# Patient Record
Sex: FEMALE | Race: WHITE | NOT HISPANIC OR LATINO | URBAN - METROPOLITAN AREA
[De-identification: names, ages, dates, MRNs, and addresses within clinical notes are randomized per-mention and may not be internally consistent; named-entity substitution may affect disease eponyms.]

---

## 2020-12-16 ENCOUNTER — APPOINTMENT (OUTPATIENT)
Dept: RADIOLOGY | Facility: CLINIC | Age: 50
End: 2020-12-16
Payer: COMMERCIAL

## 2020-12-16 ENCOUNTER — OFFICE VISIT (OUTPATIENT)
Dept: URGENT CARE | Facility: CLINIC | Age: 50
End: 2020-12-16
Payer: COMMERCIAL

## 2020-12-16 VITALS
WEIGHT: 145 LBS | DIASTOLIC BLOOD PRESSURE: 74 MMHG | SYSTOLIC BLOOD PRESSURE: 128 MMHG | HEIGHT: 63 IN | OXYGEN SATURATION: 100 % | RESPIRATION RATE: 18 BRPM | TEMPERATURE: 97.4 F | BODY MASS INDEX: 25.69 KG/M2 | HEART RATE: 89 BPM

## 2020-12-16 DIAGNOSIS — S99.912A ANKLE INJURIES, LEFT, INITIAL ENCOUNTER: ICD-10-CM

## 2020-12-16 DIAGNOSIS — S99.912A ANKLE INJURIES, LEFT, INITIAL ENCOUNTER: Primary | ICD-10-CM

## 2020-12-16 DIAGNOSIS — S82.892A MALLEOLAR FRACTURE, LEFT, CLOSED, INITIAL ENCOUNTER: ICD-10-CM

## 2020-12-16 PROCEDURE — 27786 TREATMENT OF ANKLE FRACTURE: CPT | Performed by: FAMILY MEDICINE

## 2020-12-16 PROCEDURE — 99203 OFFICE O/P NEW LOW 30 MIN: CPT | Performed by: FAMILY MEDICINE

## 2020-12-16 PROCEDURE — 73610 X-RAY EXAM OF ANKLE: CPT

## 2020-12-16 RX ORDER — GABAPENTIN 600 MG/1
600 TABLET ORAL 3 TIMES DAILY
COMMUNITY

## 2020-12-16 RX ORDER — AMITRIPTYLINE HYDROCHLORIDE 25 MG/1
25 TABLET, FILM COATED ORAL
COMMUNITY

## 2020-12-16 RX ORDER — RIZATRIPTAN BENZOATE 10 MG/1
10 TABLET ORAL ONCE AS NEEDED
COMMUNITY

## 2020-12-16 RX ORDER — AMITRIPTYLINE HYDROCHLORIDE 10 MG/1
10 TABLET, FILM COATED ORAL
COMMUNITY

## 2020-12-18 ENCOUNTER — OFFICE VISIT (OUTPATIENT)
Dept: OBGYN CLINIC | Facility: CLINIC | Age: 50
End: 2020-12-18
Payer: COMMERCIAL

## 2020-12-18 ENCOUNTER — APPOINTMENT (OUTPATIENT)
Dept: RADIOLOGY | Facility: CLINIC | Age: 50
End: 2020-12-18
Payer: COMMERCIAL

## 2020-12-18 VITALS — SYSTOLIC BLOOD PRESSURE: 137 MMHG | DIASTOLIC BLOOD PRESSURE: 94 MMHG | HEART RATE: 120 BPM | TEMPERATURE: 96.4 F

## 2020-12-18 DIAGNOSIS — S82.892A MALLEOLAR FRACTURE, LEFT, CLOSED, INITIAL ENCOUNTER: ICD-10-CM

## 2020-12-18 DIAGNOSIS — M79.662 PAIN OF LEFT CALF: ICD-10-CM

## 2020-12-18 DIAGNOSIS — S82.832A CLOSED FRACTURE OF DISTAL END OF LEFT FIBULA, UNSPECIFIED FRACTURE MORPHOLOGY, INITIAL ENCOUNTER: Primary | ICD-10-CM

## 2020-12-18 PROCEDURE — 73590 X-RAY EXAM OF LOWER LEG: CPT

## 2020-12-18 PROCEDURE — 99204 OFFICE O/P NEW MOD 45 MIN: CPT | Performed by: ORTHOPAEDIC SURGERY

## 2020-12-21 DIAGNOSIS — S82.832A CLOSED FRACTURE OF DISTAL END OF LEFT FIBULA, UNSPECIFIED FRACTURE MORPHOLOGY, INITIAL ENCOUNTER: Primary | ICD-10-CM

## 2021-01-15 ENCOUNTER — OFFICE VISIT (OUTPATIENT)
Dept: OBGYN CLINIC | Facility: CLINIC | Age: 51
End: 2021-01-15
Payer: COMMERCIAL

## 2021-01-15 ENCOUNTER — APPOINTMENT (OUTPATIENT)
Dept: RADIOLOGY | Facility: CLINIC | Age: 51
End: 2021-01-15
Payer: COMMERCIAL

## 2021-01-15 DIAGNOSIS — S82.832D CLOSED FRACTURE OF DISTAL END OF LEFT FIBULA WITH ROUTINE HEALING, UNSPECIFIED FRACTURE MORPHOLOGY, SUBSEQUENT ENCOUNTER: Primary | ICD-10-CM

## 2021-01-15 DIAGNOSIS — M79.672 LEFT FOOT PAIN: ICD-10-CM

## 2021-01-15 DIAGNOSIS — S82.832A CLOSED FRACTURE OF DISTAL END OF LEFT FIBULA, UNSPECIFIED FRACTURE MORPHOLOGY, INITIAL ENCOUNTER: ICD-10-CM

## 2021-01-15 PROCEDURE — 99214 OFFICE O/P EST MOD 30 MIN: CPT | Performed by: PHYSICIAN ASSISTANT

## 2021-01-15 PROCEDURE — 73630 X-RAY EXAM OF FOOT: CPT

## 2021-01-15 PROCEDURE — 73590 X-RAY EXAM OF LOWER LEG: CPT

## 2021-01-15 RX ORDER — BUTALBITAL/ASPIRIN/CAFFEINE 50-325-40
CAPSULE ORAL
COMMUNITY

## 2021-01-15 RX ORDER — GABAPENTIN 100 MG/1
CAPSULE ORAL
COMMUNITY
Start: 2021-01-12

## 2021-01-15 RX ORDER — PROMETHAZINE HYDROCHLORIDE 25 MG/1
TABLET ORAL
COMMUNITY

## 2021-01-15 RX ORDER — CLONAZEPAM 0.5 MG/1
TABLET ORAL
COMMUNITY
Start: 2020-10-12

## 2021-01-15 RX ORDER — SODIUM FLUORIDE 5 MG/ML
PASTE, DENTIFRICE DENTAL
COMMUNITY

## 2021-01-15 NOTE — PROGRESS NOTES
Assessment/Plan:  1  Closed fracture of distal end of left fibula, unspecified fracture morphology, initial encounter  XR tibia fibula 2 vw left    Ambulatory referral to Physical Therapy   2  Left foot pain  XR foot 3+ vw left     The patient is doing well with her ankle and will wean from her crutches over the next week or so and once she feels comfortable weightbearing in her boot she can start to wear from her boot as well  She will start physical therapy at this time for ROM and strengthening  As for her foot, I do appreciate a mass about the plantar distal 5th metatarsal region  This appears to be superficial in nature  I did discuss an MRI with IV contrast to further evaluate this mass  The patient would like to wait and think about this MRI  I did advise she check this daily and that if it appears to be gettign larger to have this MRI sooner rather than later  She did agree to this  She will call if this mass gets large or if she changes her mind and decides she would like this imaging  She will follow-up in 4 weeks with repeat xrays of her ankle  We will re-evaluate her foot at that time as well  Subjective:   Robert Roman is a 53 y o  female who presents today for follow-up of her left ankle, now about a month status post closed treatment of avulsion fracture of the distal fibula  She has been partial weightbearing in her boot with use of her crutches  She notes improvement in her pain, but still notes some swelling and discomfort about the lateral ankle  She notes pain and feeling like she is "stepping on something" about the plantar/lateral foot, since she started some weightbearing  She notes good sensation of the left lower extremity  Review of Systems   Constitutional: Negative  Negative for chills and fever  HENT: Negative  Negative for ear pain and sore throat  Eyes: Negative  Negative for pain and redness  Respiratory: Negative  Negative for shortness of breath and wheezing  Cardiovascular: Negative for chest pain and palpitations  Gastrointestinal: Negative  Negative for abdominal pain and blood in stool  Endocrine: Negative  Negative for polydipsia and polyuria  Genitourinary: Negative  Negative for difficulty urinating and dysuria  Musculoskeletal:        As noted in HPI   Skin: Negative  Negative for pallor and rash  Neurological: Negative  Negative for dizziness and numbness  Hematological: Negative  Negative for adenopathy  Does not bruise/bleed easily  Psychiatric/Behavioral: Negative  Negative for confusion and suicidal ideas           Past Medical History:   Diagnosis Date    Acinic cell carcinoma ([facility] [address] )     Anxiety     Depression     Migraines     Nerve pain        Past Surgical History:   Procedure Laterality Date    CYST REMOVAL      left wrist    HYSTERECTOMY      SINUS SURGERY      TUMOR REMOVAL      x2       Family History   Problem Relation Age of Onset    Thyroid disease Mother     Hypertension Father        Social History     Occupational History    Not on file   Tobacco Use    Smoking status: Never Smoker    Smokeless tobacco: Never Used   Substance and Sexual Activity    Alcohol use: Never     Frequency: Never    Drug use: Never    Sexual activity: Not on file         Current Outpatient Medications:     amitriptyline (ELAVIL) 10 mg tablet, Take 10 mg by mouth daily at bedtime, Disp: , Rfl:     amitriptyline (ELAVIL) 25 mg tablet, Take 25 mg by mouth daily at bedtime, Disp: , Rfl:     butalbital-acetaminophen-caffeine-codeine (FIORICET WITH CODEINE) -29-30 MG per capsule, butalbital 50 mg-acetaminophen 325 mg-caffeine 40 mg-codeine 30 mg cap, Disp: , Rfl:     clonazePAM (KlonoPIN) 0 5 mg tablet, Klonopin 0 5 mg tablet, Disp: , Rfl:     conjugated estrogens (Premarin) 0 625 mg tablet, Premarin 0 625 mg tablet, Disp: , Rfl:     gabapentin (NEURONTIN) 100 mg capsule, , Disp: , Rfl:     gabapentin (NEURONTIN) 600 MG tablet, Take 600 mg by mouth 3 (three) times a day, Disp: , Rfl:     promethazine (PHENERGAN) 25 mg tablet, promethazine 25 mg tablet, Disp: , Rfl:     rizatriptan (MAXALT) 10 MG tablet, Take 10 mg by mouth once as needed for migraine May repeat in 2 hours if needed, Disp: , Rfl:     SODIUM FLUORIDE, DENTAL GEL, (Denta 5000 Plus) 1 1 % CREA, Denta 5000 Plus 1 1 % cream  USE DAILY, Disp: , Rfl:     Vortioxetine HBr (Trintellix) 20 MG tablet, Take 20 mg by mouth daily, Disp: , Rfl:     Allergies   Allergen Reactions    Bacitracin Itching    Neomycin Other (See Comments)     Red, and swelling   Penicillins Rash and Other (See Comments)     Joint pain    Other Angioedema       Objective: There were no vitals filed for this visit  Left Ankle Exam     Tenderness   The patient is experiencing tenderness in the lateral malleolus  Swelling: mild (lateral)    Range of Motion   Dorsiflexion: 10   Plantar flexion: 20     Tests   Anterior drawer: positive    Other   Erythema: absent  Sensation: normal  Pulse: present    Comments:  Pea sized tender mass plantar distal 5th metatarsal              Physical Exam  Constitutional:       General: She is not in acute distress  Appearance: She is well-developed  HENT:      Head: Normocephalic and atraumatic  Eyes:      General: No scleral icterus  Conjunctiva/sclera: Conjunctivae normal    Neck:      Vascular: No JVD  Cardiovascular:      Rate and Rhythm: Normal rate  Pulmonary:      Effort: Pulmonary effort is normal  No respiratory distress  Skin:     General: Skin is warm  Neurological:      Mental Status: She is alert and oriented to person, place, and time  Coordination: Coordination normal          I have personally reviewed pertinent films in PACS and my interpretation is as follows:  Xrays left foot: Negative  Xrays left tib/fib: stable avulsion fracture of the lateral malleolus

## 2021-02-12 ENCOUNTER — OFFICE VISIT (OUTPATIENT)
Dept: OBGYN CLINIC | Facility: CLINIC | Age: 51
End: 2021-02-12
Payer: COMMERCIAL

## 2021-02-12 ENCOUNTER — APPOINTMENT (OUTPATIENT)
Dept: RADIOLOGY | Facility: CLINIC | Age: 51
End: 2021-02-12
Payer: COMMERCIAL

## 2021-02-12 VITALS
HEIGHT: 63 IN | BODY MASS INDEX: 25.69 KG/M2 | SYSTOLIC BLOOD PRESSURE: 130 MMHG | WEIGHT: 145 LBS | DIASTOLIC BLOOD PRESSURE: 70 MMHG | HEART RATE: 77 BPM

## 2021-02-12 DIAGNOSIS — M77.42 METATARSALGIA OF LEFT FOOT: ICD-10-CM

## 2021-02-12 DIAGNOSIS — S82.832D CLOSED FRACTURE OF DISTAL END OF LEFT FIBULA WITH ROUTINE HEALING, UNSPECIFIED FRACTURE MORPHOLOGY, SUBSEQUENT ENCOUNTER: Primary | ICD-10-CM

## 2021-02-12 DIAGNOSIS — S82.832D CLOSED FRACTURE OF DISTAL END OF LEFT FIBULA WITH ROUTINE HEALING, UNSPECIFIED FRACTURE MORPHOLOGY, SUBSEQUENT ENCOUNTER: ICD-10-CM

## 2021-02-12 PROCEDURE — 73610 X-RAY EXAM OF ANKLE: CPT

## 2021-02-12 PROCEDURE — 99214 OFFICE O/P EST MOD 30 MIN: CPT | Performed by: ORTHOPAEDIC SURGERY

## 2021-02-12 NOTE — PROGRESS NOTES
Assessment/Plan:  1  Closed fracture of distal end of left fibula with routine healing, unspecified fracture morphology, subsequent encounter  XR ankle 3+ vw left   2  Metatarsalgia of left foot         Scribe Attestation    I,:  Bill Young am acting as a scribe while in the presence of the attending physician :       I,:  Iveth Mercado MD personally performed the services described in this documentation    as scribed in my presence :         [facility] upon examination and review the x-rays of the left ankle taken today and the left foot from 1/15/2021 does demonstrate a healed distal fibula avulsion fracture  She does also demonstrate signs and symptoms consistent with metatarsalgia of the 5th metatarsal head  There is also no obvious fracture at the 5th metatarsal head  Additionally there are no signs of metastases in the ankle x-rays with the foot x-rays that she does have history of cancer  At this point she may continue to weightbear as tolerated with a regular shoe  In regards to the metatarsalgia  I did suggest less aggressive measures of treatment with a pressure relief had to be warm with her shoes  This can be done over the next 4 weeks  Should she fail to see improvements with the pressure relief pad she may simply contact my office via phone to request an MRI of the left foot to question stress fracture  [facility] verbalized understanding and was amenable to use of the past relief pad over the next 4 weeks  Should she have resolution of her pain she may continue with activities of daily living as tolerated and follow-up as needed  Subjective:   Robert Roman is a 53 y o  female who presents to the office today for follow up evaluation of the left ankle and foot  She is now 8 weeks closed treatment for a distal fibula avulsion fracture  She states that in regards to her ankle she is doing well and is participating in physical therapy and improving with her range of motion strength overall    He does have some mild swelling to the lateral aspect of the ankle  She is able to ambulate with a comfortable supportive shoe with little difficulty regards to the ankle  She does have complaints of pain to the plantar aspect of the foot at the small toe  She does remark on a sensation of walking on a Lego piece  She does also remark on a prominence of the 5th metatarsal head in relation to the contralateral side  He states that the pain is exacerbated with weight-bearing and is better while at rest   Today she denies any distal paresthesias  Review of Systems   Constitutional: Negative for chills, fever and unexpected weight change  HENT: Negative for hearing loss, nosebleeds and sore throat  Eyes: Negative for pain, redness and visual disturbance  Respiratory: Negative for cough, shortness of breath and wheezing  Cardiovascular: Negative for chest pain, palpitations and leg swelling  Gastrointestinal: Negative for abdominal pain, nausea and vomiting  Endocrine: Negative for polydipsia and polyuria  Genitourinary: Negative for dysuria and hematuria  Musculoskeletal: Positive for joint swelling  Negative for myalgias  See HPI   Skin: Negative for rash and wound  Neurological: Negative for dizziness, numbness and headaches  Psychiatric/Behavioral: Negative for decreased concentration and suicidal ideas  The patient is not nervous/anxious            Past Medical History:   Diagnosis Date    Acinic cell carcinoma ([facility])     Anxiety     Depression     Migraines     Nerve pain        Past Surgical History:   Procedure Laterality Date    CYST REMOVAL      left wrist    HYSTERECTOMY      SINUS SURGERY      TUMOR REMOVAL      x2       Family History   Problem Relation Age of Onset    Thyroid disease Mother     Hypertension Father        Social History     Occupational History    Not on file   Tobacco Use    Smoking status: Never Smoker    Smokeless tobacco: Never Used Substance and Sexual Activity    Alcohol use: Never     Frequency: Never    Drug use: Never    Sexual activity: Not on file         Current Outpatient Medications:     amitriptyline (ELAVIL) 10 mg tablet, Take 10 mg by mouth daily at bedtime, Disp: , Rfl:     amitriptyline (ELAVIL) 25 mg tablet, Take 25 mg by mouth daily at bedtime, Disp: , Rfl:     butalbital-acetaminophen-caffeine-codeine (FIORICET WITH CODEINE) -53-30 MG per capsule, butalbital 50 mg-acetaminophen 325 mg-caffeine 40 mg-codeine 30 mg cap, Disp: , Rfl:     clonazePAM (KlonoPIN) 0 5 mg tablet, Klonopin 0 5 mg tablet, Disp: , Rfl:     conjugated estrogens (Premarin) 0 625 mg tablet, Premarin 0 625 mg tablet, Disp: , Rfl:     gabapentin (NEURONTIN) 100 mg capsule, , Disp: , Rfl:     gabapentin (NEURONTIN) 600 MG tablet, Take 600 mg by mouth 3 (three) times a day, Disp: , Rfl:     promethazine (PHENERGAN) 25 mg tablet, promethazine 25 mg tablet, Disp: , Rfl:     rizatriptan (MAXALT) 10 MG tablet, Take 10 mg by mouth once as needed for migraine May repeat in 2 hours if needed, Disp: , Rfl:     SODIUM FLUORIDE, DENTAL GEL, (Denta 5000 Plus) 1 1 % CREA, Denta 5000 Plus 1 1 % cream  USE DAILY, Disp: , Rfl:     Vortioxetine HBr (Trintellix) 20 MG tablet, Take 20 mg by mouth daily, Disp: , Rfl:     Allergies   Allergen Reactions    Bacitracin Itching    Neomycin Other (See Comments)     Red, and swelling   Penicillins Rash and Other (See Comments)     Joint pain    Other Angioedema       Objective:  Vitals:    02/12/21 1147   BP: 130/70   Pulse: 77       Left Ankle Exam     Tenderness   Left ankle tenderness location: 5th metatarsal head  Swelling: mild (lateral ankle)    Range of Motion   Dorsiflexion: 25   Plantar flexion: 40   Eversion: 10   Inversion: 10     Muscle Strength   Left ankle normal muscle strength: soreness of the 5th metatarsal head    Dorsiflexion:  5/5   Plantar flexion:  5/5     Other   Erythema: absent  Scars: absent  Sensation: normal  Pulse: present          Observations   Left Ankle/Foot   Negative for adhesive scar  Strength/Myotome Testing     Left Ankle/Foot   Dorsiflexion: 5  Plantar flexion: 5      Physical Exam  Vitals signs reviewed  HENT:      Head: Normocephalic and atraumatic  Eyes:      General:         Right eye: No discharge  Left eye: No discharge  Conjunctiva/sclera: Conjunctivae normal       Pupils: Pupils are equal, round, and reactive to light  Neck:      Musculoskeletal: Normal range of motion and neck supple  Cardiovascular:      Rate and Rhythm: Normal rate  Pulmonary:      Effort: Pulmonary effort is normal  No respiratory distress  Musculoskeletal:      Comments: As noted in HPI   Skin:     General: Skin is warm and dry  Neurological:      Mental Status: She is alert and oriented to person, place, and time  I have personally reviewed pertinent films in PACS and my interpretation is as follows:    X-rays of the left ankle demonstrate a healed distal fibula avulsion fracture  X-rays of the left foot from 1/15/2021 demonstrate no obvious fracture at the 5th metatarsal head